# Patient Record
Sex: MALE | Race: WHITE
[De-identification: names, ages, dates, MRNs, and addresses within clinical notes are randomized per-mention and may not be internally consistent; named-entity substitution may affect disease eponyms.]

---

## 2022-08-30 ENCOUNTER — HOSPITAL ENCOUNTER (EMERGENCY)
Dept: HOSPITAL 47 - EC | Age: 62
Discharge: HOME | End: 2022-08-30
Payer: COMMERCIAL

## 2022-08-30 VITALS — HEART RATE: 82 BPM | TEMPERATURE: 99 F | SYSTOLIC BLOOD PRESSURE: 142 MMHG | DIASTOLIC BLOOD PRESSURE: 85 MMHG

## 2022-08-30 VITALS — RESPIRATION RATE: 18 BRPM

## 2022-08-30 DIAGNOSIS — I10: ICD-10-CM

## 2022-08-30 DIAGNOSIS — R10.11: Primary | ICD-10-CM

## 2022-08-30 LAB
ALBUMIN SERPL-MCNC: 4.7 G/DL (ref 3.5–5)
ALP SERPL-CCNC: 81 U/L (ref 38–126)
ALT SERPL-CCNC: 21 U/L (ref 4–49)
AMYLASE SERPL-CCNC: 42 U/L (ref 30–110)
ANION GAP SERPL CALC-SCNC: 14 MMOL/L
APTT BLD: 24.5 SEC (ref 22–30)
AST SERPL-CCNC: 59 U/L (ref 17–59)
BASOPHILS # BLD AUTO: 0 K/UL (ref 0–0.2)
BASOPHILS NFR BLD AUTO: 0 %
BUN SERPL-SCNC: 15 MG/DL (ref 9–20)
CALCIUM SPEC-MCNC: 9.7 MG/DL (ref 8.4–10.2)
CHLORIDE SERPL-SCNC: 99 MMOL/L (ref 98–107)
CO2 SERPL-SCNC: 26 MMOL/L (ref 22–30)
EOSINOPHIL # BLD AUTO: 0 K/UL (ref 0–0.7)
EOSINOPHIL NFR BLD AUTO: 0 %
ERYTHROCYTE [DISTWIDTH] IN BLOOD BY AUTOMATED COUNT: 4.47 M/UL (ref 4.3–5.9)
ERYTHROCYTE [DISTWIDTH] IN BLOOD: 13.5 % (ref 11.5–15.5)
GLUCOSE SERPL-MCNC: 124 MG/DL (ref 74–99)
HCT VFR BLD AUTO: 41.8 % (ref 39–53)
HGB BLD-MCNC: 14.2 GM/DL (ref 13–17.5)
INR PPP: 1 (ref ?–1.2)
LIPASE SERPL-CCNC: 44 U/L (ref 23–300)
LYMPHOCYTES # SPEC AUTO: 0.8 K/UL (ref 1–4.8)
LYMPHOCYTES NFR SPEC AUTO: 6 %
MCH RBC QN AUTO: 31.8 PG (ref 25–35)
MCHC RBC AUTO-ENTMCNC: 34.1 G/DL (ref 31–37)
MCV RBC AUTO: 93.5 FL (ref 80–100)
MONOCYTES # BLD AUTO: 0.9 K/UL (ref 0–1)
MONOCYTES NFR BLD AUTO: 8 %
NEUTROPHILS # BLD AUTO: 10.4 K/UL (ref 1.3–7.7)
NEUTROPHILS NFR BLD AUTO: 84 %
PLATELET # BLD AUTO: 230 K/UL (ref 150–450)
POTASSIUM SERPL-SCNC: 4.1 MMOL/L (ref 3.5–5.1)
PROT SERPL-MCNC: 8 G/DL (ref 6.3–8.2)
PT BLD: 10.7 SEC (ref 9–12)
SODIUM SERPL-SCNC: 139 MMOL/L (ref 137–145)
WBC # BLD AUTO: 12.3 K/UL (ref 3.8–10.6)

## 2022-08-30 PROCEDURE — 76705 ECHO EXAM OF ABDOMEN: CPT

## 2022-08-30 PROCEDURE — 96361 HYDRATE IV INFUSION ADD-ON: CPT

## 2022-08-30 PROCEDURE — 83605 ASSAY OF LACTIC ACID: CPT

## 2022-08-30 PROCEDURE — 85610 PROTHROMBIN TIME: CPT

## 2022-08-30 PROCEDURE — 36415 COLL VENOUS BLD VENIPUNCTURE: CPT

## 2022-08-30 PROCEDURE — 96375 TX/PRO/DX INJ NEW DRUG ADDON: CPT

## 2022-08-30 PROCEDURE — 82150 ASSAY OF AMYLASE: CPT

## 2022-08-30 PROCEDURE — 87040 BLOOD CULTURE FOR BACTERIA: CPT

## 2022-08-30 PROCEDURE — 74177 CT ABD & PELVIS W/CONTRAST: CPT

## 2022-08-30 PROCEDURE — 85730 THROMBOPLASTIN TIME PARTIAL: CPT

## 2022-08-30 PROCEDURE — 83690 ASSAY OF LIPASE: CPT

## 2022-08-30 PROCEDURE — 99284 EMERGENCY DEPT VISIT MOD MDM: CPT

## 2022-08-30 PROCEDURE — 85025 COMPLETE CBC W/AUTO DIFF WBC: CPT

## 2022-08-30 PROCEDURE — 80053 COMPREHEN METABOLIC PANEL: CPT

## 2022-08-30 PROCEDURE — 96374 THER/PROPH/DIAG INJ IV PUSH: CPT

## 2022-08-30 NOTE — ED
Abdominal Pain HPI





- General


Chief Complaint: Abdominal Pain


Stated Complaint: stomach pain


Time Seen by Provider: 08/30/22 10:50


Source: patient, RN notes reviewed, old records reviewed


Mode of arrival: ambulatory


Limitations: no limitations





- History of Present Illness


Initial Comments: 





This is a nontoxic-appearing 62-year-old male that presents to the emergency 

room with complaints of right upper quadrant abdominal pain for the past 3 days 

worse after eating.  Patient does have a history of diverticulitis and GERD. He 

was put on Protonix by his doctor however he has stopped taking it 3 weeks ago 

because insurance no longer covers the medication.  He states right upper 

quadrant pain is cramping in nature, radiates to his epigastrium and into his 

back.  Denies any diarrhea, vomiting or fevers.  Denies any dysuria or 

testicular pain.  Normal bowel movement today.


MD Complaint: abdominal pain


-: days(s) (3)


Location: RUQ


Radiation: epigastric, back


Severity scale (1-10): 8


Quality: cramping, fullness


Consistency: constant


Improves With: nothing


Worsens With: eating


Associated Symptoms: nausea





- Related Data


                                    Allergies











Allergy/AdvReac Type Severity Reaction Status Date / Time


 


No Known Allergies Allergy   Verified 08/30/22 09:56














Review of Systems


ROS Statement: 


Those systems with pertinent positive or pertinent negative responses have been 

documented in the HPI.





ROS Other: All systems not noted in ROS Statement are negative.





Past Medical History


Past Medical History: GERD/Reflux, Hypertension


Additional Past Medical History / Comment(s): diverticulitis


History of Any Multi-Drug Resistant Organisms: None Reported


Past Surgical History: Hernia Repair


Smoking Status: Never smoker


Past Alcohol Use History: None Reported


Past Drug Use History: None Reported





General Exam


Limitations: no limitations


General appearance: alert


Head exam: Present: atraumatic, normocephalic


Eye exam: Present: normal appearance.  Absent: scleral icterus, conjunctival 

injection, periorbital swelling


ENT exam: Present: mucous membranes moist


Neck exam: Present: full ROM.  Absent: tenderness, meningismus


Respiratory exam: Present: normal lung sounds bilaterally.  Absent: respiratory 

distress, wheezes, rales, rhonchi, stridor, chest wall tenderness, accessory 

muscle use


Cardiovascular Exam: Present: regular rate


GI/Abdominal exam: Present: soft, tenderness (Right upper quadrant), other (ole 

umbilical scar from umbilical hernia repair).  Absent: distended, guarding, 

rigid


Extremities exam: Present: normal inspection, full ROM, normal capillary refill.

 Absent: pedal edema


Back exam: Present: normal inspection, full ROM.  Absent: tenderness, CVA 

tenderness (R), CVA tenderness (L), paraspinal tenderness, vertebral tenderness,

rash noted


Neurological exam: Present: alert, oriented X3


Psychiatric exam: Present: normal affect, normal mood


Skin exam: Present: warm, dry, intact, normal color.  Absent: cyanosis, 

diaphoretic, petechiae, pallor





Course


                                   Vital Signs











  08/30/22 08/30/22





  09:51 14:00


 


Temperature 98.0 F 99.0 F


 


Pulse Rate 79 82


 


Respiratory 18 18





Rate  


 


Blood Pressure 149/82 142/85


 


O2 Sat by Pulse 98 99





Oximetry  














- Reevaluation(s)


Reevaluation #1: 





08/30/22 14:27


Spoke with Dr. Dale regarding patient, he will be discharged home to follow 

up with him in the office Thursday at 2 PM. 


Time: 14:27





Medical Decision Making





- Medical Decision Making





CT the abdomen shows gallbladder distended with suspected gallbladder wall 

thickening.  No calcification seen throughout the course of the common bile 

duct.  


Labs show no significant leukocytosis.  Electrolytes are unremarkable.  Liver 

function studies are within normal limits.  


Ultrasound shows a hydropic gallbladder with sludge and stones without 

definitive evidence of cholecystitis.


Patient was given a gram of Rocephin.


On reevaluation, patient states his pain has improved.


Case discussed with Dr Dale who is agreeable to discharging patient home, 

placed on a low-fat diet and follow-up in his office on Thursday.


Patient is agreeable to this plan of care.  Vital signs are stable. 





- Lab Data


Result diagrams: 


                                 08/30/22 11:27





                                 08/30/22 11:27


                                   Lab Results











  08/30/22 08/30/22 08/30/22 Range/Units





  11:27 11:27 11:27 


 


WBC  12.3 H    (3.8-10.6)  k/uL


 


RBC  4.47    (4.30-5.90)  m/uL


 


Hgb  14.2    (13.0-17.5)  gm/dL


 


Hct  41.8    (39.0-53.0)  %


 


MCV  93.5    (80.0-100.0)  fL


 


MCH  31.8    (25.0-35.0)  pg


 


MCHC  34.1    (31.0-37.0)  g/dL


 


RDW  13.5    (11.5-15.5)  %


 


Plt Count  230    (150-450)  k/uL


 


MPV  7.9    


 


Neutrophils %  84    %


 


Lymphocytes %  6    %


 


Monocytes %  8    %


 


Eosinophils %  0    %


 


Basophils %  0    %


 


Neutrophils #  10.4 H    (1.3-7.7)  k/uL


 


Lymphocytes #  0.8 L    (1.0-4.8)  k/uL


 


Monocytes #  0.9    (0-1.0)  k/uL


 


Eosinophils #  0.0    (0-0.7)  k/uL


 


Basophils #  0.0    (0-0.2)  k/uL


 


PT   10.7   (9.0-12.0)  sec


 


INR   1.0   (<1.2)  


 


APTT   24.5   (22.0-30.0)  sec


 


Sodium    139  (137-145)  mmol/L


 


Potassium    4.1  (3.5-5.1)  mmol/L


 


Chloride    99  ()  mmol/L


 


Carbon Dioxide    26  (22-30)  mmol/L


 


Anion Gap    14  mmol/L


 


BUN    15  (9-20)  mg/dL


 


Creatinine    0.76  (0.66-1.25)  mg/dL


 


Est GFR (CKD-EPI)AfAm    >90  (>60 ml/min/1.73 sqM)  


 


Est GFR (CKD-EPI)NonAf    >90  (>60 ml/min/1.73 sqM)  


 


Glucose    124 H  (74-99)  mg/dL


 


Plasma Lactic Acid Beau     (0.7-2.0)  mmol/L


 


Calcium    9.7  (8.4-10.2)  mg/dL


 


Total Bilirubin    1.3  (0.2-1.3)  mg/dL


 


AST    59  (17-59)  U/L


 


ALT    21  (4-49)  U/L


 


Alkaline Phosphatase    81  ()  U/L


 


Total Protein    8.0  (6.3-8.2)  g/dL


 


Albumin    4.7  (3.5-5.0)  g/dL


 


Amylase    42  ()  U/L


 


Lipase    44  ()  U/L














  08/30/22 Range/Units





  11:27 


 


WBC   (3.8-10.6)  k/uL


 


RBC   (4.30-5.90)  m/uL


 


Hgb   (13.0-17.5)  gm/dL


 


Hct   (39.0-53.0)  %


 


MCV   (80.0-100.0)  fL


 


MCH   (25.0-35.0)  pg


 


MCHC   (31.0-37.0)  g/dL


 


RDW   (11.5-15.5)  %


 


Plt Count   (150-450)  k/uL


 


MPV   


 


Neutrophils %   %


 


Lymphocytes %   %


 


Monocytes %   %


 


Eosinophils %   %


 


Basophils %   %


 


Neutrophils #   (1.3-7.7)  k/uL


 


Lymphocytes #   (1.0-4.8)  k/uL


 


Monocytes #   (0-1.0)  k/uL


 


Eosinophils #   (0-0.7)  k/uL


 


Basophils #   (0-0.2)  k/uL


 


PT   (9.0-12.0)  sec


 


INR   (<1.2)  


 


APTT   (22.0-30.0)  sec


 


Sodium   (137-145)  mmol/L


 


Potassium   (3.5-5.1)  mmol/L


 


Chloride   ()  mmol/L


 


Carbon Dioxide   (22-30)  mmol/L


 


Anion Gap   mmol/L


 


BUN   (9-20)  mg/dL


 


Creatinine   (0.66-1.25)  mg/dL


 


Est GFR (CKD-EPI)AfAm   (>60 ml/min/1.73 sqM)  


 


Est GFR (CKD-EPI)NonAf   (>60 ml/min/1.73 sqM)  


 


Glucose   (74-99)  mg/dL


 


Plasma Lactic Acid Beau  1.4  (0.7-2.0)  mmol/L


 


Calcium   (8.4-10.2)  mg/dL


 


Total Bilirubin   (0.2-1.3)  mg/dL


 


AST   (17-59)  U/L


 


ALT   (4-49)  U/L


 


Alkaline Phosphatase   ()  U/L


 


Total Protein   (6.3-8.2)  g/dL


 


Albumin   (3.5-5.0)  g/dL


 


Amylase   ()  U/L


 


Lipase   ()  U/L














Disposition


Clinical Impression: 


 Abdominal pain





Disposition: HOME SELF-CARE


Condition: Good


Instructions (If sedation given, give patient instructions):  Low Fat Diet (ED),

Abdominal Pain (ED)


Additional Instructions: 


Eat a low-fat diet.  Follow-up with Dr. Dale Thursday at 2pm in the office. 




Return to the emergency room for any new or concerning symptoms including 

increased pain, persistent nausea vomiting or fevers.


Is patient prescribed a controlled substance at d/c from ED?: No


Referrals: 


Severiano Blevins MD [Primary Care Provider] - 1-2 days


James Dale MD [STAFF PHYSICIAN] - 1-2 days


Time of Disposition: 14:29

## 2022-08-30 NOTE — US
EXAMINATION TYPE: US gallbladder

 

DATE OF EXAM: 8/30/2022

 

COMPARISON: CT abdomen pelvis 8/30/2022.

 

CLINICAL HISTORY: ab pain. RUQ pain

 

TECHNIQUE: Multiple sonographic images of the right upper quadrant are obtained.

 

FINDINGS:

 

EXAM MEASUREMENTS:

 

Liver Length:  16.8 cm   

Gallbladder Wall:  0.4 cm   

CBD:  0.4 cm

Right Kidney:  11.6 x 6.1 x 5.1 cm

 

 

Pancreas:  obscured by overlying midline bowel gas

Liver:  scanned intercostally, wnl  

Gallbladder:  hydropic, wall mildly thickened, sludge with multiple echogenic foci . No pericholecyst
ic fluid.

**Evidence for sonographic Child's sign:  no

CBD:  visualized portions wnl, limited by overlying bowel gas 

Right Kidney:  wnl 

 

 

 

IMPRESSION: 

Hydropic gallbladder with sludge and stones without definitive evidence for acute cholecystitis. If t
here is continued clinical concern, consider nuclear medicine HIDA scan.

## 2022-08-30 NOTE — CT
EXAMINATION TYPE: CT abdomen pelvis w con

 

DATE OF EXAM: 8/30/2022

 

COMPARISON:

 

HISTORY: abdominal pain, hx of diverticulitis

 

CT DLP: 1420.1 mGycm

Automated exposure control for dose reduction was used.

 

CONTRAST: 

CT scan of the abdomen pelvis is performed with IV Contrast, patient injected with 100 mL of Isovue 3
00.

 

FINDINGS- 

LUNG BASES-  No significant abnormality is appreciated. 

 

LIVER/GB-the gallbladder is distended and there is a suggestion of wall thickening. Question tiny gal
lstone and small amount of pericholecystic fluid. No definite calcifications are seen throughout the 
course of the common bile duct. PANCREAS-  No gross abnormality is seen. 

 

SPLEEN-  No gross abnormality is seen. 

 

ADRENALS-  No gross abnormality is seen.

 

KIDNEYS/BLADDER- no hydronephrosis nephrolithiasis or renal mass.

   

BOWEL-bowel gas pattern nonspecific with no obstruction. Changes of diverticulosis seen. There is a d
uodenal diverticulum. Appendix not seen with certainty. 

  

LYMPH NODES-  No greater than 1cm abdominal or pelvic lymph nodes areappreciated. 

 

OSSEOUS STRUCTURES-hypertrophic and degenerative changes of the spine. Arthropathy of the hips. Multi
level canal stenosis and foraminal protrusion ` particularly at L3-4 and L4-5.

 

OTHER-  atherosclerotic change aorta with no evidence of aneurysm. Postsurgical changes overlying the
 anterior abdominal wall. Calcifications in the pelvis are likely vascular. 

 

IMPRESSION- 

1. Gallbladder hydrops with suspected gallbladder wall thickening. Correlate with ultrasound to asses
s for acute cholecystitis.

2. The appendix is not seen with certainty.

## 2022-12-16 ENCOUNTER — HOSPITAL ENCOUNTER (OUTPATIENT)
Dept: HOSPITAL 47 - ORWHC2ENDO | Age: 62
Discharge: HOME | End: 2022-12-16
Attending: INTERNAL MEDICINE
Payer: COMMERCIAL

## 2022-12-16 VITALS — HEART RATE: 61 BPM | DIASTOLIC BLOOD PRESSURE: 68 MMHG | SYSTOLIC BLOOD PRESSURE: 116 MMHG | RESPIRATION RATE: 16 BRPM

## 2022-12-16 VITALS — TEMPERATURE: 97.9 F

## 2022-12-16 VITALS — BODY MASS INDEX: 28.5 KG/M2

## 2022-12-16 DIAGNOSIS — Z87.19: ICD-10-CM

## 2022-12-16 DIAGNOSIS — Z12.11: Primary | ICD-10-CM

## 2022-12-16 DIAGNOSIS — K64.8: ICD-10-CM

## 2022-12-16 PROCEDURE — 45378 DIAGNOSTIC COLONOSCOPY: CPT

## 2022-12-16 NOTE — P.PCN
Date of Procedure: 12/16/22


Procedure(s) Performed: 


BRIEF HISTORY: Patient is a 62-year-old pleasant male scheduled for an elective 

colonoscopy as a part of the lesion of prior history of colon polyps.  His last 

colonoscopy was 5 years ago.





PROCEDURE PERFORMED: Colonoscopy. 





PREOPERATIVE DIAGNOSIS: History of colon polyps. 





IV sedation per Anesthesia. 





PROCEDURE: After informed consent was obtained, the patient, was brought into 

the endoscopy unit. IV sedation was administered by Anesthesia under continuous 

monitoring.  Digital rectal examination was normal. Initially the Olympus CF-160

flexible video colonoscope was then inserted in the rectum, gradually advanced 

into the cecum without any difficulty. Careful examination was performed as the 

scope was gradually being withdrawn. Ileocecal valve and the appendiceal orifice

were visualized and appeared normal.  Prep was fair.. Mucosa of the cecum, 

ascending colon, transverse colon, descending colon, sigmoid colon, and rectum 

appeared normal. Retroflexion was performed in the rectum and small internal 

hemorrhoids were seen. The patient tolerated the procedure well. 





IMPRESSION: 


Normal-appearing colon from rectum to cecum no evidence of colorectal neoplasia


Small internal hemorrhoids .





RECOMMENDATIONS:  Findings of this examination were discussed with the patient 

as well as his family.  He was advised to have a repeat screening colonoscopy in

10 years..